# Patient Record
(demographics unavailable — no encounter records)

---

## 2024-10-31 NOTE — PHYSICAL EXAM
[General Appearance - Well Developed] : well developed [Normal Appearance] : normal appearance [Well Groomed] : well groomed [General Appearance - Well Nourished] : well nourished [No Deformities] : no deformities [General Appearance - In No Acute Distress] : no acute distress [Normal Oral Mucosa] : normal oral mucosa [No Oral Pallor] : no oral pallor [No Oral Cyanosis] : no oral cyanosis [Normal Jugular Venous A Waves Present] : normal jugular venous A waves present [Normal Jugular Venous V Waves Present] : normal jugular venous V waves present [No Jugular Venous Tapia A Waves] : no jugular venous tapia A waves [Respiration, Rhythm And Depth] : normal respiratory rhythm and effort [Exaggerated Use Of Accessory Muscles For Inspiration] : no accessory muscle use [Auscultation Breath Sounds / Voice Sounds] : lungs were clear to auscultation bilaterally [5th Left ICS - MCL] : palpated at the 5th LICS in the midclavicular line [Normal] : normal [Normal Rate] : normal [Rhythm Regular] : regular [Normal S1] : normal S1 [Normal S2] : normal S2 [III] : a grade 3 [Right Carotid Bruit] : right carotid bruit heard [Left Carotid Bruit] : left carotid bruit heard [___ +] : bilateral [unfilled]U+ pretibial pitting edema [Bowel Sounds] : normal bowel sounds [Abdomen Soft] : soft [Abdomen Tenderness] : non-tender [Abdomen Mass (___ Cm)] : no abdominal mass palpated [Abnormal Walk] : normal gait [Nail Clubbing] : no clubbing of the fingernails [Cyanosis, Localized] : no localized cyanosis [Petechial Hemorrhages (___cm)] : no petechial hemorrhages [] : no ischemic changes [Oriented To Time, Place, And Person] : oriented to person, place, and time [Affect] : the affect was normal [Mood] : the mood was normal [No Anxiety] : not feeling anxious

## 2024-11-05 NOTE — HISTORY OF PRESENT ILLNESS
[Preoperative Visit] : for a medical evaluation prior to surgery [Scheduled Procedure ___] : a [unfilled] [Metabolic Capacity ___Mets%] : The patient has a metabolic capacity of [unfilled] Mets%  [Good] : Good [Chest Pain] : no chest pain [Dyspnea] : no dyspnea [Lower Extremity Swelling] : no lower extremity swelling [FreeTextEntry1] : 79-year-old male with extensive past medical history but not currently on any medication comes in for preop clearance for elective left shoulder hardware removal due to infection.  Patient reports that he is normally able to walk three quarters of a block before stopping to catch his breath.  That is something that has been chronic for many years.  Denies any chest pain PND orthopnea.

## 2024-11-05 NOTE — DISCUSSION/SUMMARY
[Procedure Intermediate Risk] : the procedure risk is intermediate [Patient High Risk] : the patient is a high surgical risk [As per surgery] : as per surgery [Optimized for Surgery] : the patient is optimized for surgery [FreeTextEntry1] : 1.  preop clearance for elective left shoulder hardware removal: As patient with poor exercise tolerance unable to adequately assess METS.  Will therefore need to obtain a pharmacological nuclear stress test prior to planned procedure.  Will advise once results of a Lexiscan nuclear stress test is known. 2.  Shortness of breath: Echocardiogram 3.  Carotid bruit: Carotid duplex 4.  Hypertension: Patient reports that blood pressure typically elevated in doctors offices resting heart rate is 100 similar to the ones 4 years ago when he was last here.  Advised to follow-up with primary care physician for routine blood pressure monitoring and treatment.  ADDENDUM 11/6/24: REVIEWED NUC STRESS, NEG FOR ISCHEMIA, MAY PROCEED AS PLANNED.

## 2025-02-19 NOTE — PHYSICAL EXAM
[General Appearance - Well Developed] : well developed [Normal Appearance] : normal appearance [Well Groomed] : well groomed [General Appearance - Well Nourished] : well nourished [No Deformities] : no deformities [General Appearance - In No Acute Distress] : no acute distress [Normal Conjunctiva] : the conjunctiva exhibited no abnormalities [Eyelids - No Xanthelasma] : the eyelids demonstrated no xanthelasmas [Normal Oral Mucosa] : normal oral mucosa [No Oral Pallor] : no oral pallor [No Oral Cyanosis] : no oral cyanosis [Normal Jugular Venous A Waves Present] : normal jugular venous A waves present [Normal Jugular Venous V Waves Present] : normal jugular venous V waves present [No Jugular Venous Tapia A Waves] : no jugular venous tapia A waves [Heart Rate And Rhythm] : heart rate and rhythm were normal [Heart Sounds] : normal S1 and S2 [Murmurs] : no murmurs present [Abdomen Soft] : soft [Abdomen Tenderness] : non-tender [Abdomen Mass (___ Cm)] : no abdominal mass palpated [Nail Clubbing] : no clubbing of the fingernails [Cyanosis, Localized] : no localized cyanosis [Petechial Hemorrhages (___cm)] : no petechial hemorrhages [Skin Color & Pigmentation] : normal skin color and pigmentation [No Venous Stasis] : no venous stasis [Skin Lesions] : no skin lesions [No Skin Ulcers] : no skin ulcer [No Xanthoma] : no  xanthoma was observed [] : no respiratory distress [Respiration, Rhythm And Depth] : normal respiratory rhythm and effort [Exaggerated Use Of Accessory Muscles For Inspiration] : no accessory muscle use [Auscultation Breath Sounds / Voice Sounds] : lungs were clear to auscultation bilaterally [FreeTextEntry1] : disformed left shoulder

## 2025-02-19 NOTE — DISCUSSION/SUMMARY
[Procedure Intermediate Risk] : the procedure risk is intermediate [Patient Intermediate Risk] : the patient is an intermediate risk [Optimized for Surgery] : the patient is optimized for surgery [FreeTextEntry1] : Patient is an 80-year-old male on medications for hypertension who is preop for a recurrent left shoulder repair stress testing in November 2024 was negative for provokable ischemia he has no current hemodynamic instability EKG is unchanged he is cleared for general anesthesia and planned procedure.

## 2025-02-19 NOTE — REVIEW OF SYSTEMS
[Negative] : Gastrointestinal [Headache] : no headache [Feeling Fatigued] : not feeling fatigued [Blurry Vision] : no blurred vision [Sore Throat] : no sore throat [SOB] : no shortness of breath [Leg Claudication] : no intermittent leg claudication [Syncope] : no syncope [FreeTextEntry9] : left shoulder pain

## 2025-02-19 NOTE — HISTORY OF PRESENT ILLNESS
[Preoperative Visit] : for a medical evaluation prior to surgery [Scheduled Procedure ___] : a [unfilled] [Date of Surgery ___] : on [unfilled] [Surgeon Name ___] : surgeon: [unfilled] [Prior Anesthesia] : Prior anesthesia [Fever] : no fever [Chills] : no chills [Fatigue] : no fatigue [Chest Pain] : no chest pain [Cough] : no cough [Dyspnea] : no dyspnea [Dysuria] : no dysuria [Urinary Frequency] : no urinary frequency [Vomiting] : no vomiting [Diabetes] : no diabetes [Cardiovascular Disease] : no cardiovascular disease [Prev Anesthesia Reaction] : no previous anesthesia reaction [de-identified] : DR Alva  [FreeTextEntry1] : 2/19/25 - Summary : Deuce Gabriel presents for a pre-operative clearance consultation for a repeat shoulder surgery after an earlier procedure failed. He has a long history of trauma, going back approximately 23 years, after a fall from a roof. He reports a history of infection with Enterococcus and Staphylococcus with subsequent management that required PICC line use. - Chief Complaint (CC) : Constant shoulder discomfort and instability due to a failed past surgical repair. - History of Present Illness (HPI) : Deuce reports ongoing shoulder issues since a fall off a roof in August 2001, with intermittent surgeries done over the years. He recently experienced a failure of an ongoing surgical repair, performed 13 weeks ago, and is scheduled for a repeat procedure soon. He states feeling wobbly and reports reduced mobility, but still tries to walk as much as he can. - Past Medical History : History of Enterococcus and Staphylococcus infections. - Past Surgical History : Several shoulder surgeries over the past 23 years.

## 2025-02-19 NOTE — ADDENDUM
[FreeTextEntry1] : Austin Cannon assisted in documentation on Feb 19 2025 acting as a scribe for Dr. Ward Norton.